# Patient Record
Sex: FEMALE | Race: WHITE | Employment: OTHER | URBAN - METROPOLITAN AREA
[De-identification: names, ages, dates, MRNs, and addresses within clinical notes are randomized per-mention and may not be internally consistent; named-entity substitution may affect disease eponyms.]

---

## 2017-01-31 PROBLEM — I26.99 OTHER ACUTE PULMONARY EMBOLISM WITHOUT ACUTE COR PULMONALE (HCC): Status: ACTIVE | Noted: 2017-01-31

## 2017-01-31 PROBLEM — F11.20 UNCOMPLICATED OPIOID DEPENDENCE (HCC): Status: ACTIVE | Noted: 2017-01-31

## 2017-02-07 PROBLEM — Z86.711 HISTORY OF PULMONARY EMBOLISM: Status: ACTIVE | Noted: 2017-02-07

## 2017-03-16 PROBLEM — M81.0 OSTEOPOROSIS: Status: ACTIVE | Noted: 2017-03-16

## 2017-05-23 PROBLEM — R29.6 FREQUENT FALLS: Status: ACTIVE | Noted: 2017-05-23

## 2017-06-01 PROBLEM — I10 ESSENTIAL HYPERTENSION: Status: ACTIVE | Noted: 2017-06-01

## 2017-06-01 PROBLEM — A04.72 C. DIFFICILE DIARRHEA: Status: ACTIVE | Noted: 2017-06-01

## 2017-06-15 PROBLEM — I26.99 OTHER ACUTE PULMONARY EMBOLISM WITHOUT ACUTE COR PULMONALE (HCC): Status: RESOLVED | Noted: 2017-01-31 | Resolved: 2017-06-15

## 2017-06-15 PROBLEM — I50.32 CHRONIC DIASTOLIC CONGESTIVE HEART FAILURE (HCC): Status: ACTIVE | Noted: 2017-06-15

## 2017-11-15 PROBLEM — Z86.19 HISTORY OF CLOSTRIDIUM DIFFICILE COLITIS: Status: ACTIVE | Noted: 2017-11-15

## 2018-04-05 PROBLEM — M47.816 LUMBAR FACET ARTHROPATHY: Status: ACTIVE | Noted: 2018-04-05

## 2018-08-06 NOTE — TELEPHONE ENCOUNTER
Informed pt the Pain Clinic is unable to assist patient at this time. Verbalized understanding, no further questions at this time.

## 2018-08-06 NOTE — TELEPHONE ENCOUNTER
melvina to inform the pt she is not a candidate for the Pain Clinic-we cannot help the pt with her medical needs

## 2018-08-24 PROBLEM — I69.359 HEMIPARESIS DUE TO RECENT CEREBROVASCULAR ACCIDENT (CVA) (HCC): Status: ACTIVE | Noted: 2018-08-24

## 2018-12-10 PROBLEM — J45.30 MILD PERSISTENT ASTHMA WITHOUT COMPLICATION: Status: ACTIVE | Noted: 2018-12-10

## 2019-11-08 PROBLEM — D84.9 IMMUNOSUPPRESSED STATUS (HCC): Status: ACTIVE | Noted: 2019-11-08

## 2019-11-12 PROBLEM — I69.359 HEMIPARESIS DUE TO RECENT CEREBROVASCULAR ACCIDENT (CVA) (HCC): Status: RESOLVED | Noted: 2018-08-24 | Resolved: 2019-11-12

## 2020-12-02 PROBLEM — N18.30 STAGE 3 CHRONIC KIDNEY DISEASE, UNSPECIFIED WHETHER STAGE 3A OR 3B CKD (HCC): Status: ACTIVE | Noted: 2020-12-02

## 2021-08-18 NOTE — PROGRESS NOTES
[facility] New Problem Note    CC: Right shoulder pain    HPI: This 78year old female presents today with complaints of right shoulder pain.   Onset of symptoms occurred 2 days ago which time she fell on the right shoulder while getting up onto a sandy Surgeon: Santi Cano MD;  Location: [facility] FOR PAIN MANAGEMENT   • HIP REPLACEMENT SURGERY Right     x 3   • HYSTERECTOMY     • INJECTION, W/WO CONTRAST, DX/THERAPEUTIC SUBSTANCE, EPIDURAL/SUBARACHNOID; LUMBAR/SACRAL N/A 12/8/2015    Procedure: C tablets (20 mg total) by mouth every 7 days. 96 tablet 0   • DULoxetine HCl (CYMBALTA) 30 MG Oral Cap DR Particles Take 1 capsule (30 mg total) by mouth daily.  Take a total of 90 mg/d: 1 tablet of 30 mg and 1 tablet of 60 mg 90 capsule 1   • gabapentin 100 mouth daily. • Ondansetron HCl (ZOFRAN) 4 mg tablet Take 1 tablet (4 mg total) by mouth every 8 (eight) hours as needed for Nausea.  12 tablet 0   • methylPREDNISolone 4 MG Oral Tablet Therapy Pack Use as directed for 6 days 21 tablet 0   • modafinil 10 range of motion at the elbow, wrist, and digits. She does have pain with abduction of the shoulder. Sensation is present to light touch.           Imaging: X-rays of the right shoulder were personally viewed, independently interpreted and radiology report

## 2021-08-30 NOTE — TELEPHONE ENCOUNTER
Verified voicemail reached with verbal consent signed. Attempted to call Colletta Lamprey regarding shoulder pain questions. Reached voicemail, left voicemail and provided a detail message with my call back contact information.

## 2021-08-30 NOTE — TELEPHONE ENCOUNTER
Verified voicemail reached with verbal consent signed. Attempted to call Colletta Lamprey regarding notification to come to her appt Wednesday, no new xrays needed at this time.  Reached voicemail, left voicemail and provided a detail message with my call back

## 2021-08-30 NOTE — TELEPHONE ENCOUNTER
Spoke with patient who stated she has been maintaining her treatment course for her shouler since [facility] [place] [place] [facility].  The swelling has decreased, and pt reports since Saturday she has notified a significant increase in pain not relieve

## 2021-09-01 NOTE — PROGRESS NOTES
[facility] Progress Note      Chief Complaint:  Right shoulder pain      Subjective: Napoleon Fofana is a 78year old female who is here for reevaluation of her right shoulder.   Approximately 2 weeks ago she fell on the right shoulder while getting up requesting a stronger pain medication.   I explained that unfortunately since she is on Suboxone I am not comfortable prescribing pain medication and deferred to her pain specialist.  She is asking for the pain medication that Dr. Luz Lomax prescribed her af

## 2021-10-06 NOTE — TELEPHONE ENCOUNTER
Appt set for eval with Dr. Sherita Whitman. Okay per MD. Hip xrays ordered. Pt verbalized understanding. No further questions at this time.

## 2021-10-06 NOTE — TELEPHONE ENCOUNTER
Verified voicemail reached with verbal consent signed. Attempted to call Anayelibaldemar Moni regarding hip pain. Reached voicemail, left voicemail and provided a detail message with my call back contact information.

## 2021-10-09 NOTE — PROGRESS NOTES
Patient is a 44-year-old white female here for evaluation of her right shoulder and her right hip. Patient had a reverse total shoulder arthroplasty performed in the past.  She had a fall about 6 weeks ago injuring her right shoulder.   At that time she fo that of possible pubic rami fractures and possible SI joint strain of the right hemipelvis. In light of the findings on x-ray and clinically I have advised her going ahead with a CT scan of her pelvis with a metallic artifact reduction sequence.   She wi

## 2021-10-11 NOTE — TELEPHONE ENCOUNTER
Marika Barroso Patient's son in law called regarding patient recent admission to [facility] hospital on 10/9/21. Son in law states that patient is having severe abdominal pain, blood in stools  And increase in pain while trying to walk on the hip .  Per son in law, [facility] is

## 2021-10-18 PROBLEM — K80.20 GALLSTONES: Status: ACTIVE | Noted: 2021-10-18

## 2021-12-08 PROBLEM — L97.922 ULCER OF LEFT LOWER EXTREMITY WITH FAT LAYER EXPOSED (HCC): Status: ACTIVE | Noted: 2021-12-08

## 2021-12-16 PROBLEM — D84.9 IMMUNOCOMPROMISED (HCC): Status: ACTIVE | Noted: 2019-11-08

## 2022-01-27 PROBLEM — L97.922 ULCER OF LEFT LOWER EXTREMITY WITH FAT LAYER EXPOSED (HCC): Status: RESOLVED | Noted: 2021-12-08 | Resolved: 2022-01-27

## 2022-04-21 NOTE — PROGRESS NOTES
Patient is a 79-year-old white female here for complaints of right hip instability. Patient has had a right total hip arthroplasty performed several years ago. Patient subsequent to that was having some dislocations that required a captured cup to be placed. The patient more recently has been having a feeling that her hip is going out of place. The patient is exam shows her to have a very stable hip and no pain with passive range of motion of the right hip. Neurologically the patient has some weakness in the right leg rather diffusely. Patient's x-rays of her right hip shows her prosthesis to be in good position. No evidence of loosening. X-rays of her lumbar spine shows severe degenerative changes with degenerative scoliosis. Impression is that of radiculopathy and degenerative disc disease lumbar spine. Second impression is that of a stable right total hip arthroplasty. I explained to the patient there was no evidence of instability and that with a captured cup she will not likely have any feeling of instability except for the possibility of a dislocation with a fall. I advised the patient to see one of the back surgeons to see if there is any options for her. The arthritis is fairly severe and may have limited ability to resolve the issue. I did advise her to follow-up with me if she has any questions after she talks to them.

## 2022-06-02 ENCOUNTER — APPOINTMENT (OUTPATIENT)
Dept: WOUND CARE | Facility: HOSPITAL | Age: 77
End: 2022-06-02
Attending: CLINICAL NURSE SPECIALIST
Payer: MEDICARE

## 2022-06-15 ENCOUNTER — HOSPITAL ENCOUNTER (INPATIENT)
Facility: HOSPITAL | Age: 77
LOS: 4 days | Discharge: NURSING, REHAB OR LONG-TERM CARE | End: 2022-06-19
Attending: ORTHOPAEDIC SURGERY | Admitting: ORTHOPAEDIC SURGERY
Payer: MEDICARE

## 2022-06-15 ENCOUNTER — ANESTHESIA EVENT (OUTPATIENT)
Dept: SURGERY | Facility: HOSPITAL | Age: 77
End: 2022-06-15
Payer: MEDICARE

## 2022-06-15 ENCOUNTER — APPOINTMENT (OUTPATIENT)
Dept: GENERAL RADIOLOGY | Facility: HOSPITAL | Age: 77
End: 2022-06-15
Attending: ORTHOPAEDIC SURGERY
Payer: MEDICARE

## 2022-06-15 ENCOUNTER — ANESTHESIA (OUTPATIENT)
Dept: SURGERY | Facility: HOSPITAL | Age: 77
End: 2022-06-15
Payer: MEDICARE

## 2022-06-15 DIAGNOSIS — I63.9 CEREBROVASCULAR ACCIDENT (CVA), UNSPECIFIED MECHANISM (HCC): ICD-10-CM

## 2022-06-15 PROBLEM — M48.00 SPINAL STENOSIS: Status: ACTIVE | Noted: 2022-06-15

## 2022-06-15 PROCEDURE — 72020 X-RAY EXAM OF SPINE 1 VIEW: CPT | Performed by: ORTHOPAEDIC SURGERY

## 2022-06-15 PROCEDURE — 0SG1071 FUSION OF 2 OR MORE LUMBAR VERTEBRAL JOINTS WITH AUTOLOGOUS TISSUE SUBSTITUTE, POSTERIOR APPROACH, POSTERIOR COLUMN, OPEN APPROACH: ICD-10-PCS | Performed by: ORTHOPAEDIC SURGERY

## 2022-06-15 PROCEDURE — 00BT0ZZ EXCISION OF SPINAL MENINGES, OPEN APPROACH: ICD-10-PCS | Performed by: ORTHOPAEDIC SURGERY

## 2022-06-15 PROCEDURE — 88304 TISSUE EXAM BY PATHOLOGIST: CPT | Performed by: ORTHOPAEDIC SURGERY

## 2022-06-15 DEVICE — DURASEAL® EXACT SPINAL SEALANT SYSTEM 5ML 5 PACK
Type: IMPLANTABLE DEVICE | Site: BACK | Status: FUNCTIONAL
Brand: DURASEAL EXACT SPINAL SEALANT SYSTEM

## 2022-06-15 DEVICE — COLLAGEN DURAL REGENERATION MEMBRANE 1IN X 3IN (2.5CM X 7.5CM)
Type: IMPLANTABLE DEVICE | Site: BACK | Status: FUNCTIONAL
Brand: DURAMATRIX-ONLAY PLUS

## 2022-06-15 RX ORDER — ONDANSETRON 2 MG/ML
INJECTION INTRAMUSCULAR; INTRAVENOUS AS NEEDED
Status: DISCONTINUED | OUTPATIENT
Start: 2022-06-15 | End: 2022-06-15 | Stop reason: SURG

## 2022-06-15 RX ORDER — MORPHINE SULFATE 4 MG/ML
4 INJECTION, SOLUTION INTRAMUSCULAR; INTRAVENOUS EVERY 2 HOUR PRN
Status: DISCONTINUED | OUTPATIENT
Start: 2022-06-15 | End: 2022-06-18

## 2022-06-15 RX ORDER — FUROSEMIDE 20 MG/1
20 TABLET ORAL DAILY
COMMUNITY

## 2022-06-15 RX ORDER — ACETAMINOPHEN 325 MG/1
650 TABLET ORAL EVERY 4 HOURS PRN
Status: DISCONTINUED | OUTPATIENT
Start: 2022-06-15 | End: 2022-06-16

## 2022-06-15 RX ORDER — PHENYLEPHRINE HCL 10 MG/ML
VIAL (ML) INJECTION AS NEEDED
Status: DISCONTINUED | OUTPATIENT
Start: 2022-06-15 | End: 2022-06-15 | Stop reason: SURG

## 2022-06-15 RX ORDER — ROCURONIUM BROMIDE 10 MG/ML
INJECTION, SOLUTION INTRAVENOUS AS NEEDED
Status: DISCONTINUED | OUTPATIENT
Start: 2022-06-15 | End: 2022-06-15 | Stop reason: SURG

## 2022-06-15 RX ORDER — BISACODYL 10 MG
10 SUPPOSITORY, RECTAL RECTAL
Status: DISCONTINUED | OUTPATIENT
Start: 2022-06-15 | End: 2022-06-19

## 2022-06-15 RX ORDER — HYDROMORPHONE HYDROCHLORIDE 1 MG/ML
0.6 INJECTION, SOLUTION INTRAMUSCULAR; INTRAVENOUS; SUBCUTANEOUS EVERY 5 MIN PRN
Status: DISCONTINUED | OUTPATIENT
Start: 2022-06-15 | End: 2022-06-15 | Stop reason: HOSPADM

## 2022-06-15 RX ORDER — METOPROLOL SUCCINATE 50 MG/1
50 TABLET, EXTENDED RELEASE ORAL
Status: DISCONTINUED | OUTPATIENT
Start: 2022-06-16 | End: 2022-06-19

## 2022-06-15 RX ORDER — MORPHINE SULFATE 2 MG/ML
1 INJECTION, SOLUTION INTRAMUSCULAR; INTRAVENOUS EVERY 2 HOUR PRN
Status: DISCONTINUED | OUTPATIENT
Start: 2022-06-15 | End: 2022-06-18

## 2022-06-15 RX ORDER — MIDAZOLAM HYDROCHLORIDE 1 MG/ML
INJECTION INTRAMUSCULAR; INTRAVENOUS AS NEEDED
Status: DISCONTINUED | OUTPATIENT
Start: 2022-06-15 | End: 2022-06-15 | Stop reason: SURG

## 2022-06-15 RX ORDER — HYDROMORPHONE HYDROCHLORIDE 1 MG/ML
0.2 INJECTION, SOLUTION INTRAMUSCULAR; INTRAVENOUS; SUBCUTANEOUS EVERY 5 MIN PRN
Status: DISCONTINUED | OUTPATIENT
Start: 2022-06-15 | End: 2022-06-15 | Stop reason: HOSPADM

## 2022-06-15 RX ORDER — ONDANSETRON 2 MG/ML
4 INJECTION INTRAMUSCULAR; INTRAVENOUS EVERY 4 HOURS PRN
Status: ACTIVE | OUTPATIENT
Start: 2022-06-15 | End: 2022-06-16

## 2022-06-15 RX ORDER — OXYCODONE HYDROCHLORIDE 10 MG/1
10 TABLET ORAL EVERY 4 HOURS PRN
Status: DISCONTINUED | OUTPATIENT
Start: 2022-06-15 | End: 2022-06-16

## 2022-06-15 RX ORDER — ONDANSETRON 2 MG/ML
INJECTION INTRAMUSCULAR; INTRAVENOUS
Status: DISPENSED
Start: 2022-06-15 | End: 2022-06-15

## 2022-06-15 RX ORDER — METOCLOPRAMIDE HYDROCHLORIDE 5 MG/ML
INJECTION INTRAMUSCULAR; INTRAVENOUS AS NEEDED
Status: DISCONTINUED | OUTPATIENT
Start: 2022-06-15 | End: 2022-06-15 | Stop reason: SURG

## 2022-06-15 RX ORDER — SODIUM CHLORIDE 9 MG/ML
INJECTION, SOLUTION INTRAVENOUS CONTINUOUS
Status: DISCONTINUED | OUTPATIENT
Start: 2022-06-15 | End: 2022-06-19

## 2022-06-15 RX ORDER — DIPHENHYDRAMINE HYDROCHLORIDE 50 MG/ML
25 INJECTION INTRAMUSCULAR; INTRAVENOUS EVERY 4 HOURS PRN
Status: DISCONTINUED | OUTPATIENT
Start: 2022-06-15 | End: 2022-06-19

## 2022-06-15 RX ORDER — OXYCODONE HYDROCHLORIDE 5 MG/1
5 TABLET ORAL EVERY 4 HOURS PRN
Status: DISCONTINUED | OUTPATIENT
Start: 2022-06-15 | End: 2022-06-16

## 2022-06-15 RX ORDER — HYDROMORPHONE HYDROCHLORIDE 1 MG/ML
0.4 INJECTION, SOLUTION INTRAMUSCULAR; INTRAVENOUS; SUBCUTANEOUS EVERY 5 MIN PRN
Status: DISCONTINUED | OUTPATIENT
Start: 2022-06-15 | End: 2022-06-15 | Stop reason: HOSPADM

## 2022-06-15 RX ORDER — NALOXONE HYDROCHLORIDE 0.4 MG/ML
80 INJECTION, SOLUTION INTRAMUSCULAR; INTRAVENOUS; SUBCUTANEOUS AS NEEDED
Status: DISCONTINUED | OUTPATIENT
Start: 2022-06-15 | End: 2022-06-15 | Stop reason: HOSPADM

## 2022-06-15 RX ORDER — DULOXETIN HYDROCHLORIDE 30 MG/1
60 CAPSULE, DELAYED RELEASE ORAL EVERY MORNING
Status: DISCONTINUED | OUTPATIENT
Start: 2022-06-16 | End: 2022-06-19

## 2022-06-15 RX ORDER — CELECOXIB 200 MG/1
CAPSULE ORAL
Status: DISCONTINUED
Start: 2022-06-15 | End: 2022-06-15 | Stop reason: WASHOUT

## 2022-06-15 RX ORDER — HYDROCODONE BITARTRATE AND ACETAMINOPHEN 5; 325 MG/1; MG/1
2 TABLET ORAL ONCE AS NEEDED
Status: DISCONTINUED | OUTPATIENT
Start: 2022-06-15 | End: 2022-06-15 | Stop reason: HOSPADM

## 2022-06-15 RX ORDER — ONDANSETRON 2 MG/ML
4 INJECTION INTRAMUSCULAR; INTRAVENOUS ONCE
Status: COMPLETED | OUTPATIENT
Start: 2022-06-15 | End: 2022-06-15

## 2022-06-15 RX ORDER — METOCLOPRAMIDE HYDROCHLORIDE 5 MG/ML
10 INJECTION INTRAMUSCULAR; INTRAVENOUS EVERY 8 HOURS PRN
Status: DISCONTINUED | OUTPATIENT
Start: 2022-06-15 | End: 2022-06-15 | Stop reason: HOSPADM

## 2022-06-15 RX ORDER — HYDROCODONE BITARTRATE AND ACETAMINOPHEN 5; 325 MG/1; MG/1
1 TABLET ORAL ONCE AS NEEDED
Status: DISCONTINUED | OUTPATIENT
Start: 2022-06-15 | End: 2022-06-15 | Stop reason: HOSPADM

## 2022-06-15 RX ORDER — BUPRENORPHINE HYDROCHLORIDE AND NALOXONE HYDROCHLORIDE DIHYDRATE 8; 2 MG/1; MG/1
1 TABLET SUBLINGUAL 2 TIMES DAILY
COMMUNITY
End: 2022-06-19

## 2022-06-15 RX ORDER — PREDNISONE 1 MG/1
5 TABLET ORAL
Status: DISCONTINUED | OUTPATIENT
Start: 2022-06-16 | End: 2022-06-19

## 2022-06-15 RX ORDER — POLYETHYLENE GLYCOL 3350 17 G/17G
17 POWDER, FOR SOLUTION ORAL DAILY PRN
Status: DISCONTINUED | OUTPATIENT
Start: 2022-06-15 | End: 2022-06-19

## 2022-06-15 RX ORDER — METOPROLOL TARTRATE 5 MG/5ML
2.5 INJECTION INTRAVENOUS ONCE
Status: DISCONTINUED | OUTPATIENT
Start: 2022-06-15 | End: 2022-06-15 | Stop reason: HOSPADM

## 2022-06-15 RX ORDER — SENNOSIDES 8.6 MG
17.2 TABLET ORAL NIGHTLY
Status: DISCONTINUED | OUTPATIENT
Start: 2022-06-15 | End: 2022-06-19

## 2022-06-15 RX ORDER — CEFAZOLIN SODIUM/WATER 2 G/20 ML
2 SYRINGE (ML) INTRAVENOUS EVERY 8 HOURS
Status: COMPLETED | OUTPATIENT
Start: 2022-06-15 | End: 2022-06-16

## 2022-06-15 RX ORDER — DOCUSATE SODIUM 100 MG/1
100 CAPSULE, LIQUID FILLED ORAL 2 TIMES DAILY
Status: DISCONTINUED | OUTPATIENT
Start: 2022-06-15 | End: 2022-06-19

## 2022-06-15 RX ORDER — ACETAMINOPHEN 500 MG
1000 TABLET ORAL ONCE AS NEEDED
Status: DISCONTINUED | OUTPATIENT
Start: 2022-06-15 | End: 2022-06-15 | Stop reason: HOSPADM

## 2022-06-15 RX ORDER — HYDROMORPHONE HYDROCHLORIDE 1 MG/ML
INJECTION, SOLUTION INTRAMUSCULAR; INTRAVENOUS; SUBCUTANEOUS
Status: COMPLETED
Start: 2022-06-15 | End: 2022-06-15

## 2022-06-15 RX ORDER — LIDOCAINE HYDROCHLORIDE 10 MG/ML
INJECTION, SOLUTION EPIDURAL; INFILTRATION; INTRACAUDAL; PERINEURAL AS NEEDED
Status: DISCONTINUED | OUTPATIENT
Start: 2022-06-15 | End: 2022-06-15 | Stop reason: SURG

## 2022-06-15 RX ORDER — CEFAZOLIN SODIUM/WATER 2 G/20 ML
2 SYRINGE (ML) INTRAVENOUS ONCE
Status: COMPLETED | OUTPATIENT
Start: 2022-06-15 | End: 2022-06-15

## 2022-06-15 RX ORDER — SODIUM CHLORIDE, SODIUM LACTATE, POTASSIUM CHLORIDE, CALCIUM CHLORIDE 600; 310; 30; 20 MG/100ML; MG/100ML; MG/100ML; MG/100ML
INJECTION, SOLUTION INTRAVENOUS CONTINUOUS
Status: DISCONTINUED | OUTPATIENT
Start: 2022-06-15 | End: 2022-06-15 | Stop reason: HOSPADM

## 2022-06-15 RX ORDER — SODIUM PHOSPHATE, DIBASIC AND SODIUM PHOSPHATE, MONOBASIC 7; 19 G/133ML; G/133ML
1 ENEMA RECTAL ONCE AS NEEDED
Status: DISCONTINUED | OUTPATIENT
Start: 2022-06-15 | End: 2022-06-19

## 2022-06-15 RX ORDER — SODIUM CHLORIDE, SODIUM LACTATE, POTASSIUM CHLORIDE, CALCIUM CHLORIDE 600; 310; 30; 20 MG/100ML; MG/100ML; MG/100ML; MG/100ML
INJECTION, SOLUTION INTRAVENOUS CONTINUOUS
Status: DISCONTINUED | OUTPATIENT
Start: 2022-06-15 | End: 2022-06-19

## 2022-06-15 RX ORDER — GABAPENTIN 100 MG/1
200 CAPSULE ORAL 3 TIMES DAILY
Status: DISCONTINUED | OUTPATIENT
Start: 2022-06-15 | End: 2022-06-17

## 2022-06-15 RX ORDER — ONDANSETRON 2 MG/ML
4 INJECTION INTRAMUSCULAR; INTRAVENOUS EVERY 6 HOURS PRN
Status: DISCONTINUED | OUTPATIENT
Start: 2022-06-15 | End: 2022-06-15 | Stop reason: HOSPADM

## 2022-06-15 RX ORDER — MORPHINE SULFATE 2 MG/ML
2 INJECTION, SOLUTION INTRAMUSCULAR; INTRAVENOUS EVERY 2 HOUR PRN
Status: DISCONTINUED | OUTPATIENT
Start: 2022-06-15 | End: 2022-06-19

## 2022-06-15 RX ORDER — MEPERIDINE HYDROCHLORIDE 25 MG/ML
12.5 INJECTION INTRAMUSCULAR; INTRAVENOUS; SUBCUTANEOUS AS NEEDED
Status: DISCONTINUED | OUTPATIENT
Start: 2022-06-15 | End: 2022-06-15 | Stop reason: HOSPADM

## 2022-06-15 RX ORDER — DEXAMETHASONE SODIUM PHOSPHATE 4 MG/ML
VIAL (ML) INJECTION AS NEEDED
Status: DISCONTINUED | OUTPATIENT
Start: 2022-06-15 | End: 2022-06-15 | Stop reason: SURG

## 2022-06-15 RX ORDER — ACETAMINOPHEN 500 MG
1000 TABLET ORAL ONCE
Status: DISCONTINUED | OUTPATIENT
Start: 2022-06-15 | End: 2022-06-15 | Stop reason: HOSPADM

## 2022-06-15 RX ORDER — DIPHENHYDRAMINE HCL 25 MG
25 CAPSULE ORAL EVERY 4 HOURS PRN
Status: DISCONTINUED | OUTPATIENT
Start: 2022-06-15 | End: 2022-06-19

## 2022-06-15 RX ORDER — LEVOTHYROXINE SODIUM 112 UG/1
112 TABLET ORAL
Status: DISCONTINUED | OUTPATIENT
Start: 2022-06-16 | End: 2022-06-19

## 2022-06-15 RX ORDER — METHOCARBAMOL 500 MG/1
1 TABLET, FILM COATED ORAL 3 TIMES DAILY
Status: ON HOLD | COMMUNITY
Start: 2022-06-14 | End: 2022-06-19

## 2022-06-15 RX ORDER — VANCOMYCIN HYDROCHLORIDE 1 G/20ML
INJECTION, POWDER, LYOPHILIZED, FOR SOLUTION INTRAVENOUS AS NEEDED
Status: DISCONTINUED | OUTPATIENT
Start: 2022-06-15 | End: 2022-06-15 | Stop reason: HOSPADM

## 2022-06-15 RX ORDER — CEFAZOLIN SODIUM/WATER 2 G/20 ML
SYRINGE (ML) INTRAVENOUS
Status: DISPENSED
Start: 2022-06-15 | End: 2022-06-15

## 2022-06-15 RX ORDER — MELATONIN
800 DAILY
Status: DISCONTINUED | OUTPATIENT
Start: 2022-06-16 | End: 2022-06-19

## 2022-06-15 RX ADMIN — SODIUM CHLORIDE, SODIUM LACTATE, POTASSIUM CHLORIDE, CALCIUM CHLORIDE: 600; 310; 30; 20 INJECTION, SOLUTION INTRAVENOUS at 14:00:00

## 2022-06-15 RX ADMIN — PHENYLEPHRINE HCL 100 MCG: 10 MG/ML VIAL (ML) INJECTION at 13:51:00

## 2022-06-15 RX ADMIN — METOCLOPRAMIDE HYDROCHLORIDE 10 MG: 5 INJECTION INTRAMUSCULAR; INTRAVENOUS at 12:49:00

## 2022-06-15 RX ADMIN — MIDAZOLAM HYDROCHLORIDE 2 MG: 1 INJECTION INTRAMUSCULAR; INTRAVENOUS at 12:49:00

## 2022-06-15 RX ADMIN — ONDANSETRON 4 MG: 2 INJECTION INTRAMUSCULAR; INTRAVENOUS at 12:49:00

## 2022-06-15 RX ADMIN — SODIUM CHLORIDE, SODIUM LACTATE, POTASSIUM CHLORIDE, CALCIUM CHLORIDE: 600; 310; 30; 20 INJECTION, SOLUTION INTRAVENOUS at 14:52:00

## 2022-06-15 RX ADMIN — PHENYLEPHRINE HCL 100 MCG: 10 MG/ML VIAL (ML) INJECTION at 14:22:00

## 2022-06-15 RX ADMIN — PHENYLEPHRINE HCL 100 MCG: 10 MG/ML VIAL (ML) INJECTION at 14:08:00

## 2022-06-15 RX ADMIN — CEFAZOLIN SODIUM/WATER 2 G: 2 G/20 ML SYRINGE (ML) INTRAVENOUS at 13:00:00

## 2022-06-15 RX ADMIN — LIDOCAINE HYDROCHLORIDE 50 MG: 10 INJECTION, SOLUTION EPIDURAL; INFILTRATION; INTRACAUDAL; PERINEURAL at 12:49:00

## 2022-06-15 RX ADMIN — SODIUM CHLORIDE, SODIUM LACTATE, POTASSIUM CHLORIDE, CALCIUM CHLORIDE: 600; 310; 30; 20 INJECTION, SOLUTION INTRAVENOUS at 13:28:00

## 2022-06-15 RX ADMIN — DEXAMETHASONE SODIUM PHOSPHATE 8 MG: 4 MG/ML VIAL (ML) INJECTION at 12:49:00

## 2022-06-15 RX ADMIN — ROCURONIUM BROMIDE 50 MG: 10 INJECTION, SOLUTION INTRAVENOUS at 12:49:00

## 2022-06-15 NOTE — ANESTHESIA POSTPROCEDURE EVALUATION
[address] [place] [address] Patient Status:  Outpatient in a Bed   Age/Gender 78year old female MRN [id number]   Location [address] [facility] Attending Kitty Shelby MD   Hosp Day # 0 PCP Zainab Thompson MD       Anesthesia Post-op Note    LUMBAR 2 LUMBAR 3, LUMBAR 3 - LUMBAR 4, LUMBAR 4 - LUMBAR 5 DECOMPRESSION AND UNINSTRUMENTED FUSION    Procedure Summary     Date: 06/15/22 Room / Location: [facility] MAIN OR 11 / [facility] MAIN OR    Anesthesia Start: 6205 Anesthesia Stop:     Procedure: LUMBAR 2 LUMBAR 3, LUMBAR 3 - LUMBAR 4, LUMBAR 4 - LUMBAR 5 DECOMPRESSION AND UNINSTRUMENTED FUSION (N/A Spine Lumbar) Diagnosis: (LUMBAR 3 - LUMBAR 4 STENOSIS, LUMBAR 2 - LUMBAR 5 STENOSIS)    Surgeons: Kitty Shelby MD Anesthesiologist: Shavonne Berg DO    Anesthesia Type: general ASA Status: 3          Anesthesia Type: general    Vitals Value Taken Time   /83 06/15/22 1532   Temp 99 06/15/22 1532   Pulse 95 06/15/22 1532   Resp 15 06/15/22 1532   SpO2 96 06/15/22 1532       Patient Location: PACU    Anesthesia Type: general    Airway Patency: patent    Postop Pain Control: adequate    Mental Status: preanesthetic baseline    Nausea/Vomiting: none    Cardiopulmonary/Hydration status: stable euvolemic    Complications: no apparent anesthesia related complications    Postop vital signs: stable    Dental Exam: Unchanged from Preop    Patient to be discharged from PACU when criteria met.

## 2022-06-15 NOTE — ANESTHESIA PROCEDURE NOTES
Airway  Date/Time: 6/15/2022 12:56 PM  Urgency: Elective    Airway not difficult    General Information and Staff    Patient location during procedure: OR  Anesthesiologist: Alem Archer DO  Resident/CRNA: Gaby Bhatia CRNA  Performed: CRNA     Indications and Patient Condition  Indications for airway management: anesthesia  Sedation level: deep  Preoxygenated: yes  Patient position: sniffing  Mask difficulty assessment: 1 - vent by mask    Final Airway Details  Final airway type: endotracheal airway      Successful airway: ETT  Cuffed: yes   Successful intubation technique: direct laryngoscopy  Facilitating devices/methods: intubating stylet  Endotracheal tube insertion site: oral  Blade: Akila  Blade size: #4  ETT size (mm): 7.0    Cormack-Lehane Classification: grade IIA - partial view of glottis  Placement verified by: chest auscultation and capnometry   Cuff volume (mL): 7  Measured from: teeth  ETT to teeth (cm): 21  Number of attempts at approach: 1    Additional Comments  Teeth lips and tongue pre induction condition.

## 2022-06-15 NOTE — PROGRESS NOTES
Pt on suboxone with apparent attempting to wean. Had normal intra-op response to narcotics intra-op & post op (manageable pain after 1mg dialaudid & 50 mcg fentanyl). There was no plan that I could find in [facility] on how to continue to manage patient. Primary will attempt to discuss with prescribing doc. We will be available to help also.

## 2022-06-15 NOTE — BRIEF OP NOTE
Pre-Operative Diagnosis: LUMBAR 3 - LUMBAR 4 STENOSIS, LUMBAR 2 - LUMBAR 5 STENOSIS     Post-Operative Diagnosis: LUMBAR 3 - LUMBAR 4 STENOSIS, LUMBAR 2 - LUMBAR 5 STENOSIS      Procedure Performed:   LUMBAR 2 LUMBAR 3, LUMBAR 3 - LUMBAR 4, LUMBAR 4 - LUMBAR 5 DECOMPRESSION AND UNINSTRUMENTED FUSION    Surgeon(s) and Role:     * Neeraj Raines MD - Primary    Assistant(s):  PA: Ludmila Mckoy     Surgical Findings: above     Specimen: L3-4 epidural mass     Estimated Blood Loss: Blood Output: 100 mL (6/15/2022  2:51 PM)      Dictation Number:      HAYDEN Valentine  6/15/2022  3:23 PM

## 2022-06-16 LAB
HCT VFR BLD AUTO: 29.7 %
HGB BLD-MCNC: 9.4 G/DL

## 2022-06-16 PROCEDURE — 97530 THERAPEUTIC ACTIVITIES: CPT

## 2022-06-16 PROCEDURE — 97161 PT EVAL LOW COMPLEX 20 MIN: CPT

## 2022-06-16 PROCEDURE — 85014 HEMATOCRIT: CPT | Performed by: PHYSICIAN ASSISTANT

## 2022-06-16 PROCEDURE — 97165 OT EVAL LOW COMPLEX 30 MIN: CPT

## 2022-06-16 PROCEDURE — 85018 HEMOGLOBIN: CPT | Performed by: PHYSICIAN ASSISTANT

## 2022-06-16 RX ORDER — OXYCODONE HYDROCHLORIDE 5 MG/1
5 TABLET ORAL EVERY 4 HOURS PRN
Status: DISCONTINUED | OUTPATIENT
Start: 2022-06-16 | End: 2022-06-17

## 2022-06-16 RX ORDER — FUROSEMIDE 20 MG/1
20 TABLET ORAL DAILY
Status: DISCONTINUED | OUTPATIENT
Start: 2022-06-16 | End: 2022-06-19

## 2022-06-16 RX ORDER — ACETAMINOPHEN 325 MG/1
650 TABLET ORAL EVERY 4 HOURS PRN
Status: DISCONTINUED | OUTPATIENT
Start: 2022-06-16 | End: 2022-06-17

## 2022-06-16 NOTE — PLAN OF CARE
A&Ox4. VSS. On 2L O2 via nasal cannula. . SCDs and Teds on BLE. Tolerating diet. Last BM 6/15. Nelson catheter in place postop. Pain managed with PO medication. Dressing to back C/D/I. Hemovac drain to suction. Plan is for PT/OT eval today. PMR consult. Patient updated on plan of care. Safety precautions in place. Call light within reach. Will continue to monitor.

## 2022-06-16 NOTE — CM/SW NOTE
PMR recommending acute rehab. Referral sent to  via [facility] per pt preference. Await confirmation of acceptance and medical clearance for discharge. / to remain available for support and/or discharge planning.      Luke Blanco [facility]  Discharge Planner  [phone]

## 2022-06-16 NOTE — PLAN OF CARE
Pt Aox4 drowsy, VSS on 2L, , SCDs, IS and ankle pumps encouraged. Dressing to lower back CDI with hemovac drain to suction. Pain managed with PRN medication. Denies sob, chest pain, n/v. IVF as ordered. Regular diet as tolerated. Last BM 06/15, Nelson in place. Reminded to \"call, don't fall\", call light placed within reach, safety precaution in place. POC discussed with patient. Plan: PT/OT to see, monitor drain  Will continue to monitor.

## 2022-06-17 RX ORDER — HYDROMORPHONE HYDROCHLORIDE 1 MG/ML
1 INJECTION, SOLUTION INTRAMUSCULAR; INTRAVENOUS; SUBCUTANEOUS EVERY 4 HOURS PRN
Status: DISCONTINUED | OUTPATIENT
Start: 2022-06-17 | End: 2022-06-18

## 2022-06-17 RX ORDER — OXYCODONE HYDROCHLORIDE 10 MG/1
20 TABLET ORAL EVERY 4 HOURS PRN
Status: DISCONTINUED | OUTPATIENT
Start: 2022-06-17 | End: 2022-06-18

## 2022-06-17 RX ORDER — GABAPENTIN 300 MG/1
300 CAPSULE ORAL 3 TIMES DAILY
Status: DISCONTINUED | OUTPATIENT
Start: 2022-06-17 | End: 2022-06-19

## 2022-06-17 RX ORDER — DEXAMETHASONE SODIUM PHOSPHATE 4 MG/ML
8 VIAL (ML) INJECTION ONCE
Status: COMPLETED | OUTPATIENT
Start: 2022-06-17 | End: 2022-06-17

## 2022-06-17 RX ORDER — ACETAMINOPHEN 325 MG/1
650 TABLET ORAL EVERY 4 HOURS PRN
Status: DISCONTINUED | OUTPATIENT
Start: 2022-06-17 | End: 2022-06-18

## 2022-06-17 RX ORDER — OXYCODONE HYDROCHLORIDE 5 MG/1
5 TABLET ORAL EVERY 4 HOURS PRN
Status: DISCONTINUED | OUTPATIENT
Start: 2022-06-17 | End: 2022-06-18

## 2022-06-17 RX ORDER — HYDROMORPHONE HYDROCHLORIDE 2 MG/1
1 TABLET ORAL EVERY 4 HOURS PRN
Status: DISCONTINUED | OUTPATIENT
Start: 2022-06-17 | End: 2022-06-17

## 2022-06-17 NOTE — CM/SW NOTE
Patient accepted at [facility] for acute rehab. Updated pt who is agreeable with DC plan. Await medical clearance for discharge. / to remain available for support and/or discharge planning.      Lillian Muñoz [facility]  Discharge Planner  [phone]

## 2022-06-17 NOTE — PLAN OF CARE
Pt AOx4, VSS on RA, , SCDs, TEDs, IS and ankle pumps encouraged. Dressing to lower back CDI with hemovac drain. C/o severe pain to back relieved with PRN PO/IV medication. Denies sob, chest pain, n/v. Regular diet as tolerated. Last BM 05/15, Nelson in place. WBAT. Reminded to \"call, don't fall\", call light placed within reach, safety precaution in place. POC discussed with patient. Plan: DC to Acute rehab ([facility] awaiting placement)  Will continue to monitor.

## 2022-06-17 NOTE — PLAN OF CARE
A&Ox4. VSS. On room air. . SCDs and Teds on BLE. Tolerating diet. Last BM 6/15. Nelson catheter in place postop - to be removed today. Patient reporting severe pain this morning, HAYDEN Butts aware - pain medications adjusted. Patient now asleep. Dressing to back C/D/I. Hemovac drain to suction - to be removed today. PT recommending acute rehab. Patient updated on plan of care. Safety precautions in place. Call light within reach. Will continue to monitor.

## 2022-06-18 LAB
ANION GAP SERPL CALC-SCNC: 5 MMOL/L (ref 0–18)
BUN BLD-MCNC: 15 MG/DL (ref 7–18)
CALCIUM BLD-MCNC: 8.4 MG/DL (ref 8.5–10.1)
CHLORIDE SERPL-SCNC: 96 MMOL/L (ref 98–112)
CO2 SERPL-SCNC: 28 MMOL/L (ref 21–32)
CREAT BLD-MCNC: 0.81 MG/DL
ERYTHROCYTE [DISTWIDTH] IN BLOOD BY AUTOMATED COUNT: 13.3 %
GLUCOSE BLD-MCNC: 135 MG/DL (ref 70–99)
HCT VFR BLD AUTO: 31.4 %
HGB BLD-MCNC: 10.3 G/DL
MCH RBC QN AUTO: 32.3 PG (ref 26–34)
MCHC RBC AUTO-ENTMCNC: 32.8 G/DL (ref 31–37)
MCV RBC AUTO: 98.4 FL
OSMOLALITY SERPL CALC.SUM OF ELEC: 271 MOSM/KG (ref 275–295)
PLATELET # BLD AUTO: 203 10(3)UL (ref 150–450)
POTASSIUM SERPL-SCNC: 3.7 MMOL/L (ref 3.5–5.1)
PROCALCITONIN SERPL-MCNC: <0.05 NG/ML (ref ?–0.16)
RBC # BLD AUTO: 3.19 X10(6)UL
SARS-COV-2 RNA RESP QL NAA+PROBE: NOT DETECTED
SODIUM SERPL-SCNC: 129 MMOL/L (ref 136–145)
WBC # BLD AUTO: 12.4 X10(3) UL (ref 4–11)

## 2022-06-18 PROCEDURE — 85027 COMPLETE CBC AUTOMATED: CPT | Performed by: INTERNAL MEDICINE

## 2022-06-18 PROCEDURE — 80048 BASIC METABOLIC PNL TOTAL CA: CPT | Performed by: INTERNAL MEDICINE

## 2022-06-18 PROCEDURE — 84145 PROCALCITONIN (PCT): CPT | Performed by: INTERNAL MEDICINE

## 2022-06-18 RX ORDER — OXYCODONE HYDROCHLORIDE 10 MG/1
10 TABLET ORAL EVERY 4 HOURS PRN
Status: DISCONTINUED | OUTPATIENT
Start: 2022-06-18 | End: 2022-06-19

## 2022-06-18 RX ORDER — ACETAMINOPHEN 325 MG/1
650 TABLET ORAL EVERY 4 HOURS PRN
Status: DISCONTINUED | OUTPATIENT
Start: 2022-06-18 | End: 2022-06-19

## 2022-06-18 RX ORDER — POTASSIUM CHLORIDE 20 MEQ/1
40 TABLET, EXTENDED RELEASE ORAL ONCE
Status: COMPLETED | OUTPATIENT
Start: 2022-06-18 | End: 2022-06-18

## 2022-06-18 RX ORDER — OXYCODONE HYDROCHLORIDE 10 MG/1
20 TABLET ORAL EVERY 4 HOURS PRN
Status: DISCONTINUED | OUTPATIENT
Start: 2022-06-18 | End: 2022-06-19

## 2022-06-18 NOTE — OPERATIVE REPORT
[facility]    PATIENT'S NAME: Daxa Montemayor   ATTENDING PHYSICIAN: Angeles Maradiaga M.D. OPERATING PHYSICIAN: Angeles Maradiaga M.D. PATIENT ACCOUNT#:   [de-identified]    LOCATION:  [address] [place] [address]  MEDICAL RECORD #:   [id number]       YOB: 1945  ADMISSION DATE:       06/15/2022      OPERATION DATE:  06/15/2022    OPERATIVE REPORT      PREOPERATIVE DIAGNOSIS:  L2-3, L3-4, L4-5 severe stenosis, scoliosis, neural foraminal stenosis, and epidural mass. POSTOPERATIVE DIAGNOSIS:  L2-3, L3-4, L4-5 severe stenosis, scoliosis, neural foraminal stenosis, and epidural mass. PROCEDURE PERFORMED:  L2-3, L3-4, L4-5 decompression, uninstrumented fusion with removal of epidural mass. INDICATIONS:  The patient had failed reasonable conservative measures which are outlined in the patient's clinic medical record. Physical therapy, medical management, injections, alternative treatment are among those offered unless motor deficits are progressive. Indications for surgery are based on scientific literature and MARITZA guidelines. A thorough meeting with the patient and at least one key caregiver was had. The risks and benefits were discussed in significant detail. The risks include, but are not limited to, bleeding, infection, spinal leaks, nerve injuries, recurrent disc herniation, lumbar instability, and need for further surgery. I have gone over the operation using models, diagrams, and the patient's own imaging studies. Additional written and digital sources were provided. No guarantees were made. ASSISTANT:  Lindsay Roberts PA-C. A skilled and experienced assistant was required for the entire surgical procedure. As a lumbar spinal reconstruction, the procedure is done in immediate proximity to critical neural elements and is done in close proximity to the critical vascular and visceral structures. Much of the surgery is done in and around the cauda equine and its exiting nerves.   Any assistance, including, but not limited to, retraction, suction, and other means of facilitation of the procedure requires an individual with substantial postgraduate training and substantial spinal surgical experience. ESTIMATED BLOOD LOSS:  100 mL. DESCRIPTION OF PROCEDURE:  After obtaining informed consent, the patient was taken to the operating room. SCDs and NANCY hose were applied. Preoperative antibiotics were delivered. The patient was placed in the prone position on a Willie frame. All bony prominences were padded. Incision was marked and locally prepped. A spinal needle was applied. X-ray confirmed appropriate localization of our incision. The needle was removed, and the back was sterilely prepped and draped. A longitudinal incision was made with a #10 blade. Bovie electrocautery was used for hemostasis. Dissection was taken down to the level of the fascia. Subperiosteal dissection was taken at the level of the facet joints but keeping the facet capsules entirely intact. Self-retaining retractors were applied. We first began at the proximal level of our decompression and removed portions of the interspinous ligament and spinous processes. This was done at all indicated levels. Curettes were used to gain access to the canal.  Hummel ball was used to dissect ligamentum flavum away and free from the neural elements. Then 4 and 5-mm Kerrisons were used to resect thickened ligamentum flavum and central canal stenosis of the lamina. Attention then turned to the L2-3 level where the cranial and caudal nerve roots were both explored. This was done by elevating a plane with the [facility] ball and curettes between the ligamentum flavum and the dura. With the dura dissected free and downward, we identified the plane and then removed this with 3, 4, and 5 mm Kerrisons. Attention then turned to the foraminotomies, the cranial and caudal nerve roots at this level.   Contralateral decompression was undertaken thus creating a bilateral decompression and bilateral microforaminotomy and hemilaminotomy using undercutting technique. An undercutting facet sparing hemilaminectomy was also completed. With undercutting technique and a Hummel ball in place, we excised ligamentum flavum through the bone at the cranial foramina as needed, facilitating a serial cranial decompression and undercutting microforaminotomy and hemilaminotomy versus undercutting hemilaminectomy. This was done in an undercutting fashion as well and done to examine the central and contralateral neural elements facilitating central bilateral decompression as well. This was done using undercutting technique up to the level of the pedicle, with an extraforaminal area also being decompressed with the undercutting technique. At the conclusion of the decompression, all areas were free of neurologic compression. Attention then turned to the L3-4 level where the cranial and caudal nerve roots were both explored. This was done by elevating a plane with the Rickh Matters ball and curettes between the ligamentum flavum and the dura. With the dura dissected free and downward, we identified the plane and then removed this with 3, 4, and 5 mm Kerrisons. Attention then turned to the foraminotomies, the cranial and caudal nerve roots at this level. Contralateral decompression was undertaken thus creating a bilateral decompression and bilateral microforaminotomy and hemilaminotomy using undercutting technique. An undercutting facet sparing hemilaminectomy was also completed. With undercutting technique and a Hummel ball in place, we excised ligamentum flavum through the bone at the cranial foramina as needed, facilitating a serial cranial decompression and undercutting microforaminotomy and hemilaminotomy versus undercutting hemilaminectomy.   This was done in an undercutting fashion as well and done to examine the central and contralateral neural elements facilitating central bilateral decompression as well. This was done using undercutting technique up to the level of the pedicle, with an extraforaminal area also being decompressed with the undercutting technique. During the process of decompression, additional cystic pathology in excess of degenerative anatomy was identified. The pathology was carefully evaluated, and the decision was made to remove it using microdecompressive techniques. Additional specialized instruments were requested and utilized to establish extradural planes. Microcurrettes and pituitaries were employed to remove the pathology. The tissue was examined and sent to Pathology. Please see hospital reports for results. At the conclusion of the decompression, all areas were free of neurologic compression. Attention then turned to the L4-5 level where the cranial and caudal nerve roots were both explored. This was done by elevating a plane with the [facility] ball and curettes between the ligamentum flavum and the dura. With the dura dissected free and downward, we identified the plane and then removed this with 3, 4, and 5 mm Kerrisons. Attention then turned to the foraminotomies, the cranial and caudal nerve roots at this level. Contralateral decompression was undertaken thus creating a bilateral decompression and bilateral microforaminotomy and hemilaminotomy using undercutting technique. An undercutting facet sparing hemilaminectomy was also completed. With undercutting technique and a Hummel ball in place, we excised ligamentum flavum through the bone at the cranial foramina as needed, facilitating a serial cranial decompression and undercutting microforaminotomy and hemilaminotomy versus undercutting hemilaminectomy. This was done in an undercutting fashion as well and done to examine the central and contralateral neural elements facilitating central bilateral decompression as well.  This was done using undercutting technique up to the level of the pedicle, with an extraforaminal area also being decompressed with the undercutting technique. At the conclusion of the decompression, all areas were free of neurologic compression. After assessing the complete decompression, we evaluated the facets. An excess of 50% of the facets bilaterally were removed, thus increasing the likelihood of subsequent instability. We then elected to proceed with the posterolateral fusion at the L2-3, L3-4, and L4-5 levels. Posterior elements were decorticated. Local bone was applied. Final x-ray was taken to confirm appropriate levels were addressed. The wound was thoroughly irrigated. Hemostasis was maintained. FloSeal was applied to help with hemostasis and then was irrigated away. Valsalva maneuver confirmed that there was no spinal leak. Margoth Lias was applied, subfascial drain was applied. The fascia was closed in water-tight fashion with figure-of-eight 1-0 Vicryl; 2-0 Vicryl was used for the subcutaneous tissues and running 3-0 subcuticular stitches were applied. Steri-Strips were applied. Sterile dressings were applied. The patient was extubated and taken to the recovery room in stable condition and was neurologically intact at its baseline and stable on arrival.  Needle and Ray-Queenie counts were correct at the conclusion of the case.     Dictated By Asya Broussard M.D.  d: 06/18/2022 14:47:02  t: 06/18/2022 16:27:25  Job [id number]  X/

## 2022-06-18 NOTE — PLAN OF CARE
POD 3 L2-L5 fusion, Pt is AAOX4, VSS, room air, NANCY's & SCD's, IV SL, PO meds for pain, see MAR, dressing changed to coverlet, no drainage noted, steri-strips remain in place, Hx on neuropathy to BLE, voiding to purewick, Rt LE weakness, plan to discharge to [facility] when medically ready, Pt doing well, all needs met, all safety measures in place, call light within reach.

## 2022-06-18 NOTE — PLAN OF CARE
Patient A & O x4. VSS, on RA. C/o severe pain, PO medication given. Dressing to back is clean, dry and intact. Gel ice wrap applied. Patient was able to stand at edge of bed with x2 assist with walker and gait belt. Safety measures in place. Instructed to use call light.

## 2022-06-18 NOTE — CM/SW NOTE
DENZEL spoke with Amanuel Rodriguez at [facility] regarding pt's discharge, and she reviewed chart, and they are unable to accept pt today due to low sodium, and elevated WBC's. Pt will require a rapid COVID test 72 hours prior to admitting to [facility]. RN updated. DENZEL to follow.

## 2022-06-18 NOTE — PLAN OF CARE
Paged hospitalist D/T : [facility] won't accept Pt today D/T Sodium=129 and WBC 12.4,    No new orders at this time.

## 2022-06-19 VITALS
HEIGHT: 63 IN | BODY MASS INDEX: 27.34 KG/M2 | DIASTOLIC BLOOD PRESSURE: 60 MMHG | RESPIRATION RATE: 18 BRPM | WEIGHT: 154.31 LBS | HEART RATE: 72 BPM | OXYGEN SATURATION: 98 % | SYSTOLIC BLOOD PRESSURE: 130 MMHG | TEMPERATURE: 99 F

## 2022-06-19 LAB
ANION GAP SERPL CALC-SCNC: 4 MMOL/L (ref 0–18)
BUN BLD-MCNC: 21 MG/DL (ref 7–18)
CALCIUM BLD-MCNC: 8.5 MG/DL (ref 8.5–10.1)
CHLORIDE SERPL-SCNC: 100 MMOL/L (ref 98–112)
CO2 SERPL-SCNC: 31 MMOL/L (ref 21–32)
CREAT BLD-MCNC: 0.68 MG/DL
ERYTHROCYTE [DISTWIDTH] IN BLOOD BY AUTOMATED COUNT: 13.4 %
GLUCOSE BLD-MCNC: 87 MG/DL (ref 70–99)
HCT VFR BLD AUTO: 30.9 %
HGB BLD-MCNC: 10.1 G/DL
LACTATE SERPL-SCNC: 0.6 MMOL/L (ref 0.4–2)
MAGNESIUM SERPL-MCNC: 2 MG/DL (ref 1.6–2.6)
MCH RBC QN AUTO: 32.1 PG (ref 26–34)
MCHC RBC AUTO-ENTMCNC: 32.7 G/DL (ref 31–37)
MCV RBC AUTO: 98.1 FL
OSMOLALITY SERPL CALC.SUM OF ELEC: 282 MOSM/KG (ref 275–295)
PLATELET # BLD AUTO: 228 10(3)UL (ref 150–450)
POTASSIUM SERPL-SCNC: 3.7 MMOL/L (ref 3.5–5.1)
POTASSIUM SERPL-SCNC: 3.7 MMOL/L (ref 3.5–5.1)
RBC # BLD AUTO: 3.15 X10(6)UL
SODIUM SERPL-SCNC: 135 MMOL/L (ref 136–145)
WBC # BLD AUTO: 9.9 X10(3) UL (ref 4–11)

## 2022-06-19 PROCEDURE — 83735 ASSAY OF MAGNESIUM: CPT | Performed by: INTERNAL MEDICINE

## 2022-06-19 PROCEDURE — 87040 BLOOD CULTURE FOR BACTERIA: CPT | Performed by: INTERNAL MEDICINE

## 2022-06-19 PROCEDURE — 84132 ASSAY OF SERUM POTASSIUM: CPT | Performed by: INTERNAL MEDICINE

## 2022-06-19 PROCEDURE — 80048 BASIC METABOLIC PNL TOTAL CA: CPT | Performed by: INTERNAL MEDICINE

## 2022-06-19 PROCEDURE — 85027 COMPLETE CBC AUTOMATED: CPT | Performed by: INTERNAL MEDICINE

## 2022-06-19 PROCEDURE — 83605 ASSAY OF LACTIC ACID: CPT | Performed by: INTERNAL MEDICINE

## 2022-06-19 RX ORDER — GABAPENTIN 100 MG/1
300 CAPSULE ORAL 3 TIMES DAILY
Qty: 90 CAPSULE | Refills: 0 | Status: SHIPPED | OUTPATIENT
Start: 2022-06-19

## 2022-06-19 RX ORDER — METHOCARBAMOL 500 MG/1
500 TABLET, FILM COATED ORAL 4 TIMES DAILY PRN
Qty: 60 TABLET | Refills: 1 | Status: SHIPPED | OUTPATIENT
Start: 2022-06-19

## 2022-06-19 RX ORDER — OXYCODONE HYDROCHLORIDE 20 MG/1
20 TABLET ORAL EVERY 4 HOURS PRN
Qty: 60 TABLET | Refills: 0 | Status: SHIPPED | OUTPATIENT
Start: 2022-06-19

## 2022-06-19 RX ORDER — POTASSIUM CHLORIDE 20 MEQ/1
40 TABLET, EXTENDED RELEASE ORAL ONCE
Status: COMPLETED | OUTPATIENT
Start: 2022-06-19 | End: 2022-06-19

## 2022-06-19 RX ORDER — METHOCARBAMOL 500 MG/1
500 TABLET, FILM COATED ORAL EVERY 6 HOURS PRN
Status: DISCONTINUED | OUTPATIENT
Start: 2022-06-19 | End: 2022-06-19

## 2022-06-19 RX ORDER — NALOXONE HYDROCHLORIDE 4 MG/.1ML
4 SPRAY, METERED NASAL AS NEEDED
Qty: 1 KIT | Refills: 0 | Status: SHIPPED | OUTPATIENT
Start: 2022-06-19

## 2022-06-19 NOTE — CM/SW NOTE
Per RN, patient ok to go to [facility] for [facility]te ambulance to  at 2pm.  PCS form completed.     RN to call report, updated with room number     1112   unit #[phone]

## 2022-06-19 NOTE — PROGRESS NOTES
Discharge AVS reviewed with patient. Discharged education video played for patient. All questions answered. IV removed. Oxy script placed in discharge packet and given to patient. Patient decided to have her daughter pick her up instead of the medicar. Daughter will be here around 2:00-2:15PM.    1330: Called [address] [place] [address] at #[phone]. Spoke with Charlotte, provided my name, callback number, and patient's estimated discharge time. Nurse to call me back for report. 1350: received call back from Lexa Alfaro at [address] [place] [address] gave her report.

## 2022-06-19 NOTE — PLAN OF CARE
Patient A & O x4. VSS, on RA. C/o mod-severe pain, PO meds given. Surgical incision to back is covered with coverlet is clean, dry and intact. Voiding via purewick. Safety measures in place. Instructed to use call light.

## 2022-06-19 NOTE — PLAN OF CARE
Pain controlled on oxycodone and Robaxin PRN. Coverlet clean and dry to back, will change today. Vitals stable on room air. Tolerating regular diet. Voiding without difficulty. Up with min assist and walker. SCD's and katlin hose on bilaterally. Fall precautions in place. Plan for discharge to [facility] at 1400. Plan of care discussed with patient who agrees.

## 2022-06-21 NOTE — DISCHARGE SUMMARY
[facility]  Discharge Summary    Romeo Slater Patient Status:  Inpatient    1945 MRN [id number]   [facility] 3SW-A Attending No att. providers found   Hosp Day # 4 PCP Erasmo Del Valle MD     Date of Admission: 6/15/2022    Date of Discharge: 2022    Admitting Diagnosis: LUMBAR 3 - LUMBAR 4 STENOSIS, LUMBAR 2 - LUMBAR 5 STENOSIS  Spinal stenosis    Discharge Diagnosis: Patient Active Problem List:     Right shoulder pain     Complete rupture of rotator cuff     RA (rheumatoid arthritis) (HCC)     Chronic narcotic use     Fibromyalgia     Subscapularis avulsion     Hypothyroid     Migraines     Other and unspecified hyperlipidemia     Major depression     Bilateral knee pain     H/O total knee replacement, left     H/O total knee replacement, right     Status post total right knee replacement     Right patella fracture, closed, initial encounter     Spondylosis of lumbar region without myelopathy or radiculopathy     DDD (degenerative disc disease), lumbar     Lumbar radiculitis     Spinal stenosis of lumbar region     Osteopenia     Palpitations     Rheumatoid arthritis of multiple sites with negative rheumatoid factor (HCC)     HLA-B27 spondyloarthropathy     Uncomplicated opioid dependence ([address]     History of pulmonary embolism     Osteoporosis     Frequent falls     C. difficile diarrhea     Essential hypertension     Chronic diastolic congestive heart failure ([address]     History of Clostridium difficile colitis     Lumbar facet arthropathy     Hemiparesis due to recent cerebrovascular accident (CVA) ([address]     Mild persistent asthma without complication     Immunocompromised ([address]     Stage 3 chronic kidney disease, unspecified whether stage 3a or 3b CKD ([address]     Gallstones     Spinal stenosis      Hospital course: The patient was admitted on above date to undergo elective surgical intervention understanding risks and benefits to surgery after failing conservative care.   Patient underwent   Surgical Procedures     Case IDs Date Procedure Surgeon Location Status    [id number] 6/15/22 LUMBAR 2 LUMBAR 3, LUMBAR 3 - LUMBAR 4, LUMBAR 4 - LUMBAR 5 DECOMPRESSION AND UNINSTRUMENTED FUSION Alonso Hodge MD [address] [facility] OR [facility]        Afterward, pt was brought to the PACU in stable condition. After the recovery room the patient was transferred to the floor using standard protocol orders. Once on the floor the patient was followed by spine service and medical services as well as appropriate ancillary consultations throughout the hospital stay. The patient participated in Physical and Occupational Therapy making steady progressive gains. Once deemed stable by all services the patient was discharged on the above date. Standard discharge instructions were given and they were asked to follow up in clinic in 2 weeks. Disposition: Inpt Physical Rehab Facility or Physical Rehab Unit      Discharge Medications: Discharge Medication List as of 6/19/2022 12:36 PM    START taking these medications    oxyCODONE 20 MG Oral Tab  Take 1 tablet (20 mg total) by mouth every 4 (four) hours as needed. , Print Script, Disp-60 tablet, R-0    Naloxone HCl 4 MG/0.1ML Nasal Liquid  4 mg by Nasal route as needed. If patient remains unresponsive, repeat dose in other nostril 2-5 minutes after first dose., Normal, Disp-1 kit, R-0      CONTINUE these medications which have CHANGED    aspirin 81 MG Oral Tab  Take 1 tablet (81 mg total) by mouth daily. Ok to resume on 6/20/2022, Historical, R-0    gabapentin 100 MG Oral Cap  Take 3 capsules (300 mg total) by mouth 3 (three) times daily. , Normal, Disp-90 capsule, R-0    methocarbamol 500 MG Oral Tab  Take 1 tablet (500 mg total) by mouth 4 (four) times daily as needed. , Print Script, Disp-60 tablet, R-1      CONTINUE these medications which have NOT CHANGED    furosemide 20 MG Oral Tab  Take 20 mg by mouth daily. , Historical    Teriparatide, Recombinant, (FORTEO SC)  Inject into the skin. Once per day - Patient has not started yet, Historical    ondansetron (ZOFRAN) 4 mg tablet  Take 1 tablet (4 mg total) by mouth every 8 (eight) hours as needed for Nausea., Normal, Disp-21 tablet, R-0    methotrexate 2.5 MG Oral Tab  Take 8 tablets (20 mg total) by mouth every 7 days. , Normal, Disp-96 tablet, R-0    levothyroxine 112 MCG Oral Tab  Take 1 tablet (112 mcg total) by mouth daily. , Normal, Disp-90 tablet, R-1    DULoxetine 60 MG Oral Cap DR Particles  Take 1 capsule (60 mg total) by mouth every morning., Normal, Disp-90 capsule, R-1    omeprazole 20 MG Oral Capsule Delayed Release  Take 1 capsule (20 mg total) by mouth every morning., Normal, Disp-180 capsule, R-2BID WITH MEALS    predniSONE 5 MG Oral Tab  Take 1 tablet (5 mg total) by mouth once daily. , Normal, Disp-90 tablet, R-1    metoprolol succinate 50 MG Oral Tablet 24 Hr  Take 1 tablet (50 mg total) by mouth daily. , Normal, Disp-90 tablet, R-0    amoxicillin 500 MG Oral Cap  Prior to dental work only, Historical    Budesonide-Formoterol Fumarate (SYMBICORT) 160-4.5 MCG/ACT Inhalation Aerosol  Inhale 2 puffs into the lungs 2 (two) times daily. , Normal, Disp-3 each, R-1    Etanercept (ENBREL SURECLICK) 50 MG/ML Subcutaneous Solution Auto-injector  Inject 50 mg into the skin once a week., Normal, Disp-12 Syringe, R-0    SUMAtriptan Succinate 100 MG Oral Tab  TAKE 1/2 TO 1 TABLET BY MOUTH AS NEEDED FOR MIGRAINE AS DIRECTED, Normal, Disp-9 tablet, R-2    folic acid 296 MCG Oral Tab  Take 1 tablet (800 mcg total) by mouth daily. , Historical, Disp-30 tablet, R-0    EPINEPHrine 0.3 MG/0.3ML Injection Solution Auto-injector  Inject as directed. PRN for bee sting, Historical    Acidophilus/Pectin (PROBIOTIC) Oral Cap  Take 1 capsule by mouth daily. , Historical      STOP taking these medications    HYDROcodone-acetaminophen 5-325 MG Oral Tab    ibuprofen 200 MG Oral Tab    Buprenorphine HCl-Naloxone HCl 8-2 MG Sublingual SL Tab          Delmi Stake Víctor Robles [place]  6/21/2022  7:55 AM

## 2023-04-18 ENCOUNTER — TELEPHONE (OUTPATIENT)
Dept: ORTHOPEDICS CLINIC | Facility: CLINIC | Age: 78
End: 2023-04-18

## 2023-04-18 NOTE — TELEPHONE ENCOUNTER
LMOM letting patient know Dr. Colin Hutchison has ordered right hip imaging. Patient can get imaging done either today or arrive early to tomorrow's appointment. Asked that she call us back to let us know what she prefers.

## 2023-04-19 ENCOUNTER — HOSPITAL ENCOUNTER (OUTPATIENT)
Dept: GENERAL RADIOLOGY | Age: 78
Discharge: HOME | End: 2023-04-19
Attending: ORTHOPAEDIC SURGERY
Payer: MEDICARE

## 2023-04-19 ENCOUNTER — LAB ENCOUNTER (OUTPATIENT)
Dept: LAB | Age: 78
End: 2023-04-19
Attending: ORTHOPAEDIC SURGERY
Payer: MEDICARE

## 2023-04-19 ENCOUNTER — OFFICE VISIT (OUTPATIENT)
Dept: ORTHOPEDICS CLINIC | Facility: CLINIC | Age: 78
End: 2023-04-19
Payer: MEDICARE

## 2023-04-19 ENCOUNTER — TELEPHONE (OUTPATIENT)
Dept: ORTHOPEDICS CLINIC | Facility: CLINIC | Age: 78
End: 2023-04-19

## 2023-04-19 VITALS — WEIGHT: 154 LBS | HEIGHT: 63 IN | BODY MASS INDEX: 27.29 KG/M2

## 2023-04-19 DIAGNOSIS — M25.551 HIP PAIN, RIGHT: ICD-10-CM

## 2023-04-19 DIAGNOSIS — D64.9 ANEMIA, UNSPECIFIED: Primary | ICD-10-CM

## 2023-04-19 DIAGNOSIS — M51.36 DDD (DEGENERATIVE DISC DISEASE), LUMBAR: ICD-10-CM

## 2023-04-19 DIAGNOSIS — Z96.641 H/O TOTAL HIP ARTHROPLASTY, RIGHT: ICD-10-CM

## 2023-04-19 DIAGNOSIS — Z96.641 H/O TOTAL HIP ARTHROPLASTY, RIGHT: Primary | ICD-10-CM

## 2023-04-19 DIAGNOSIS — M70.61 TROCHANTERIC BURSITIS OF RIGHT HIP: ICD-10-CM

## 2023-04-19 LAB
BASOPHILS # BLD AUTO: 0.08 X10(3) UL (ref 0–0.2)
BASOPHILS NFR BLD AUTO: 0.9 %
DEPRECATED HBV CORE AB SER IA-ACNC: 170.7 NG/ML
DEPRECATED RDW RBC AUTO: 56.9 FL (ref 35.1–46.3)
EOSINOPHIL # BLD AUTO: 0.3 X10(3) UL (ref 0–0.7)
EOSINOPHIL NFR BLD AUTO: 3.4 %
ERYTHROCYTE [DISTWIDTH] IN BLOOD BY AUTOMATED COUNT: 16.2 % (ref 11–15)
HCT VFR BLD AUTO: 37.1 %
HGB BLD-MCNC: 11.6 G/DL
IMM GRANULOCYTES # BLD AUTO: 0.04 X10(3) UL (ref 0–1)
IMM GRANULOCYTES NFR BLD: 0.5 %
IRON SATN MFR SERPL: 8 %
IRON SERPL-MCNC: 27 UG/DL
LYMPHOCYTES # BLD AUTO: 1.89 X10(3) UL (ref 1–4)
LYMPHOCYTES NFR BLD AUTO: 21.7 %
MCH RBC QN AUTO: 30.2 PG (ref 26–34)
MCHC RBC AUTO-ENTMCNC: 31.3 G/DL (ref 31–37)
MCV RBC AUTO: 96.6 FL
MONOCYTES # BLD AUTO: 0.24 X10(3) UL (ref 0.1–1)
MONOCYTES NFR BLD AUTO: 2.8 %
NEUTROPHILS # BLD AUTO: 6.17 X10 (3) UL (ref 1.5–7.7)
NEUTROPHILS # BLD AUTO: 6.17 X10(3) UL (ref 1.5–7.7)
NEUTROPHILS NFR BLD AUTO: 70.7 %
PLATELET # BLD AUTO: 235 10(3)UL (ref 150–450)
RBC # BLD AUTO: 3.84 X10(6)UL
TIBC SERPL-MCNC: 352 UG/DL (ref 240–450)
TRANSFERRIN SERPL-MCNC: 236 MG/DL (ref 200–360)
WBC # BLD AUTO: 8.7 X10(3) UL (ref 4–11)

## 2023-04-19 PROCEDURE — 36415 COLL VENOUS BLD VENIPUNCTURE: CPT

## 2023-04-19 PROCEDURE — 73502 X-RAY EXAM HIP UNI 2-3 VIEWS: CPT | Performed by: ORTHOPAEDIC SURGERY

## 2023-04-19 PROCEDURE — 82728 ASSAY OF FERRITIN: CPT

## 2023-04-19 PROCEDURE — 85025 COMPLETE CBC W/AUTO DIFF WBC: CPT

## 2023-04-19 PROCEDURE — 84466 ASSAY OF TRANSFERRIN: CPT

## 2023-04-19 PROCEDURE — 83540 ASSAY OF IRON: CPT

## 2023-04-19 RX ORDER — TRIAMCINOLONE ACETONIDE 40 MG/ML
40 INJECTION, SUSPENSION INTRA-ARTICULAR; INTRAMUSCULAR ONCE
Status: COMPLETED | OUTPATIENT
Start: 2023-04-19 | End: 2023-04-19

## 2023-04-19 RX ORDER — HYDROCODONE BITARTRATE AND ACETAMINOPHEN 5; 325 MG/1; MG/1
TABLET ORAL
COMMUNITY
Start: 2023-04-12

## 2023-04-19 RX ADMIN — TRIAMCINOLONE ACETONIDE 40 MG: 40 INJECTION, SUSPENSION INTRA-ARTICULAR; INTRAMUSCULAR at 14:44:00

## 2023-04-19 NOTE — TELEPHONE ENCOUNTER
Future Appointments   Date Time Provider Vignesh Baumani   4/19/2023  2:00 PM [facility] XR IC RM1 [facility] RAD [facility] [place]   4/19/2023  2:40 PM Kayla Irizarry MD [facility] OR[facility]O [facility] [place]       Patient already scheduled her own xray.

## 2023-04-19 NOTE — TELEPHONE ENCOUNTER
Future Appointments   Date Time Provider Vignesh Winston   4/19/2023  2:40 PM Mariia Shipley MD [facility] [place]       Patient was trying to schedule her own xray for today's appt but the status says pending so she wants to know why she cannot schedule. Please advise. Thanks.     Patient can be reached at [phone]

## 2023-04-19 NOTE — TELEPHONE ENCOUNTER
X-Ray ordered. Please schedule with patient. Thank you.     Future Appointments   Date Time Provider Vignesh Tish   4/19/2023  2:40 PM Mena Walters MD [facility] OR[facility]O [facility]

## 2023-04-24 NOTE — PROGRESS NOTES
Patient is a 61-year-old white female complaining of right hip pain leg pain. Patient has had a right total hip arthroplasty performed in the past.  That was converted to a constrained liner after having recurrent dislocations. Patient presents today with complaints of some right hip pain mostly laterally located. Patient also has some pain in the right buttock region. Patient is also had problems with her lumbar spine in the past.  Patient denies significant weakness of the lower extremities that has changed recently. Patient's primary complaint is that of pain. Patient's daughter is with her today. Patient's exam shows that have no significant pain with passive range of motion of the right hip. No guarding. Patient does ambulate with a walker but shows a nonantalgic gait. Patient does have mild to moderate tenderness over the lateral aspect of the right hip. Active flexion extension of both the hip knee and ankle are present. Patient's x-rays of the right hip shows well-maintained hip arthroplasty with constrained ring around the acetabulum in normal position. No evidence of loosening of the femoral component. Impression is that of trochanteric bursitis of the right hip. Second impression is that of possible radiculopathy of the right leg related to the lumbar spine. Third impression is that of stable right total hip arthroplasty. Recommendations are to go ahead with a cortisone injection which was done in the trochanteric bursal region of the right hip. Patient tolerated it well. Patient was advised to follow-up with me in about a month's time if she is not improving. If problem persist we might consider a bone scan. I would also probably will have her evaluated for the lumbar spine prior to going in that direction since this does not appear to be a prosthetic loosening issue. wheelchair

## 2023-09-06 ENCOUNTER — HOSPITAL ENCOUNTER (OUTPATIENT)
Dept: GENERAL RADIOLOGY | Age: 78
Discharge: HOME | End: 2023-09-06
Attending: ORTHOPAEDIC SURGERY
Payer: MEDICARE

## 2023-09-06 ENCOUNTER — OFFICE VISIT (OUTPATIENT)
Dept: ORTHOPEDICS CLINIC | Facility: CLINIC | Age: 78
End: 2023-09-06
Payer: MEDICARE

## 2023-09-06 VITALS — BODY MASS INDEX: 27.29 KG/M2 | HEIGHT: 63 IN | WEIGHT: 154 LBS

## 2023-09-06 DIAGNOSIS — M19.012 PRIMARY OSTEOARTHRITIS OF LEFT SHOULDER: ICD-10-CM

## 2023-09-06 DIAGNOSIS — M25.512 LEFT SHOULDER PAIN, UNSPECIFIED CHRONICITY: ICD-10-CM

## 2023-09-06 DIAGNOSIS — M75.42 IMPINGEMENT SYNDROME OF LEFT SHOULDER: Primary | ICD-10-CM

## 2023-09-06 DIAGNOSIS — M75.102 NON-TRAUMATIC ROTATOR CUFF TEAR, LEFT: ICD-10-CM

## 2023-09-06 PROCEDURE — 73030 X-RAY EXAM OF SHOULDER: CPT | Performed by: ORTHOPAEDIC SURGERY

## 2023-09-06 PROCEDURE — 20610 DRAIN/INJ JOINT/BURSA W/O US: CPT | Performed by: ORTHOPAEDIC SURGERY

## 2023-09-06 PROCEDURE — 99213 OFFICE O/P EST LOW 20 MIN: CPT | Performed by: ORTHOPAEDIC SURGERY

## 2023-09-06 RX ORDER — TRIAMCINOLONE ACETONIDE 40 MG/ML
40 INJECTION, SUSPENSION INTRA-ARTICULAR; INTRAMUSCULAR ONCE
Status: COMPLETED | OUTPATIENT
Start: 2023-09-06 | End: 2023-09-06

## 2023-09-06 RX ADMIN — TRIAMCINOLONE ACETONIDE 40 MG: 40 INJECTION, SUSPENSION INTRA-ARTICULAR; INTRAMUSCULAR at 14:02:00

## 2023-09-06 NOTE — PROGRESS NOTES
Patient is a 17-year-old white female here for follow-up on her left shoulder. Patient has had persistent pain of the shoulder that is somewhat progressive. Patient denies any recent trauma. Patient's brother is with her today. Patient's exam shows her to have crepitation with passive range of motion left shoulder. Patient has some weakness of abduction left shoulder. Forward elevation is to about 140 degrees. Abduction about 110 to 120 degrees. X-rays of the left shoulder shows complete joint space narrowing as well as subacromial space narrowing. Minimal to mild osteophyte formation present. Some mild to moderate osteopenia present. Impression is that of degenerative arthritis and chronic rotator cuff tear of the left shoulder. Second impression is that of impingement of left shoulder. Recommendations are to go ahead with a cortisone injection which was done under sterile technique through a lateral approach with 4 cc of Xylocaine and Marcaine and 40 mg of Kenalog. Patient tolerated the injection well. Patient was advised to follow-up with us as needed. Patient is aware that I am retiring and that she should follow-up with Dr. Charlee Humphrey or Dr. Khang Moraes if needed. Patient does realize that if injections are not effective and if her pain increases a reverse total shoulder arthroplasty would be a consideration.

## 2023-10-25 ENCOUNTER — TELEPHONE (OUTPATIENT)
Dept: ORTHOPEDICS CLINIC | Facility: CLINIC | Age: 78
End: 2023-10-25

## 2023-10-25 DIAGNOSIS — M25.551 RIGHT HIP PAIN: Primary | ICD-10-CM

## 2023-10-25 NOTE — TELEPHONE ENCOUNTER
Imaging was completed for this patient for a Pain right hip, but it needs to be reviewed to see if additional imaging is needed. If so, please enter the appropriate RX and let the patient know to come in before their appointment to complete the additional imaging. Thank you!     Future Appointments   Date Time Provider Vignesh Winston   12/4/2023  2:00 PM Fatoumata Jackson MD [facility]

## (undated) DEVICE — WRAP COOLING BACK W/NO PILLOW

## (undated) DEVICE — UNDYED BRAIDED (POLYGLACTIN 910), SYNTHETIC ABSORBABLE SUTURE: Brand: COATED VICRYL

## (undated) DEVICE — 3M™ TEGADERM™ TRANSPARENT FILM DRESSING, 1626W, 4 IN X 4-3/4 IN (10 CM X 12 CM), 50 EACH/CARTON, 4 CARTON/CASE: Brand: 3M™ TEGADERM™

## (undated) DEVICE — MEGADYNE E-Z CLEAN BLADE 4IN

## (undated) DEVICE — STERILE POLYISOPRENE POWDER-FREE SURGICAL GLOVES: Brand: PROTEXIS

## (undated) DEVICE — ELECTRODE EDGE PENCIL 10FT

## (undated) DEVICE — FLOSEAL HEMOSTATIC MATRIX, 5ML: Brand: FLOSEAL HEMOSTATIC MATRIX

## (undated) DEVICE — Device

## (undated) DEVICE — KIT DRN 1/8IN PVC 3 SPRG EVAC

## (undated) DEVICE — SLEEVE KENDALL SCD EXPRESS MED

## (undated) DEVICE — SOLUTION  .9 1000ML BTL

## (undated) DEVICE — LAMINECTOMY CDS: Brand: MEDLINE INDUSTRIES, INC.

## (undated) DEVICE — SUT VICRYL 1 OS-6 J535H

## (undated) DEVICE — Device: Brand: INTELLICART™

## (undated) DEVICE — PEN SKIN MARKING REG TIP VIOLT

## (undated) DEVICE — 3.0MM PRECISION ROUND

## (undated) DEVICE — LIGHT HANDLE

## (undated) DEVICE — 5.0MM PRECISION ROUND

## (undated) DEVICE — 450 ML BOTTLE OF 0.05% CHLORHEXIDINE GLUCONATE IN 99.95% STERILE WATER FOR IRRIGATION, USP AND APPLICATOR.: Brand: IRRISEPT ANTIMICROBIAL WOUND LAVAGE

## (undated) DEVICE — SUT VICRYL 2-0 FSL J589H

## (undated) DEVICE — 3M(TM) TEGADERM(TM) TRANSPARENT FILM DRESSING FRAME STYLE 1628: Brand: 3M™ TEGADERM™

## (undated) NOTE — LETTER
Patient Name: Lucian Pineda CSN: [id number]  -Age / Sex: 1945-A: 78 y  adult Medical Records: [id number]    ABNORMAL VALUES  Surgeon(s):  Félix Jose MD  Anesthesia Type: General  Procedure Description: LUMBAR 3 - LUMBAR 4 DISCECTOMY WITH UNINSTRUMENTED FUSION, LUMBAR 2 LUMBAR 3, LUMBAR 3 - LUMBAR 4, LUMBAR 4 - LUMBAR 5 DECOMPRESSION  Primary Surgeon:  Félix Jose MD  Phone Number: [phone]    PLEASE NOTE THE FOLLOWING ABNORMALITIES:   UA - Please disregard previous letter regarding abnormal UA  ________________________________________________________

## (undated) NOTE — LETTER
ZARIA Notifier: [address]  B. Patient Name: Yobany Ferreira Identification Number: [id number]    Advance Beneficiary Notice of Non-coverage (ABN)  NOTE: If Medicare doesn't pay for D. item/service(s) below, you may have to pay. Medicare does not pay for everything, even some care that you or your health care provider have good reason to think you need. We expect Medicare may not pay for the D. item/service(s) below. DTressa Fahad Reason Medicare May Not Pay: F. Estimated Cost   ___Kenalog with Lidocaine and Marcaine   $320  ___Betamethasone with Lidocaine and/or Marcaine    $48  ___ Kenalog, Ketorolac, with Lidocaine and/or Marcaine   $370  ___Durolane      $6879  ___Euflexxa          $804  ___Hyalgan/Supartz  $6373  ___Orthovisc         $196  ___Synvisc   $1104  ___Synvisc One    $368  ___Monovisc         $1088  ___Zilretta        $856  ___Injection intra-articular $260  __  Medicare does not cover this service      __  Medicare may not pay for this   item/service for your condition     __  Medicare may not pay for this item/service as often as this          WHAT YOU NEED TO DO NOW:  Read this notice, so you can make an informed decision about your care. Ask us any questions that you may have after you finish reading. Choose an option below about whether to receive the D. item/service(s) listed above. Note: If you choose Option 1 or 2, we may help you to use any other insurance that you might have, but Medicare cannot require us to do this. G. OPTIONS: Check only one box. We cannot choose a box for you. OPTION 1. I want the D. item/service(s) listed above. You may ask to be paid now, but I also want Medicare billed for an official decision on payment, which is sent to me on a Medicare Summary Notice (MSN). I understand that if Medicare doesn't pay, I am responsible for payment, but I can appeal to Medicare by following the directions on the MSN.  If Medicare does pay, you will refund any payments I made to you, less co-pays or deductibles. OPTION 2. I want the D. item/service(s) listed above, but do not bill Medicare. You may ask to be paid now as I am responsible for payment. I cannot appeal if Medicare is not billed. OPTION 3. I don't want the D. item/service(s) listed above. I understand with this choice I am not responsible for payment, and I cannot appeal to see if Medicare would pay. H. Additional Information: This notice gives our opinion, not an official Medicare decision. If you have other questions on this notice or Medicare billing, call 1-800-MEDICARE (1-105.682.3055/ZUS: 9-930.809.4720). Signing below means that you have received and understand this notice. You may ask to receive a copy. I. Signature: J. Date:   You have the right to get Medicare information in an accessible format, like large print, Braille, or audio. You also have the right to file a complaint if you feel you've been discriminated against. Visit Medicare.gov/about- us/npsrmpkelgcrw-whsxzjfzckpqlxsaw-szxncq. According to the Paperwork Reduction Act of 1995, no persons are required to respond to a collection of information unless it displays a valid OMB control number. The valid OMB control number for this information collection is 6183-6557. The time required to complete this information collection is estimated to average 7 minutes per response, including the time to review instructions, search existing data resources, gather the data needed, and complete and review the information collection. If you have comments concerning the accuracy of the time estimate or suggestions for improving this form, please write to: CMS, [address], Attn: [address]     Form CMS-R-131 (Exp.01/31/2026) Form Approved OMB No. [id number]

## (undated) NOTE — LETTER
Patient Name: Chalo Perry CSN: [id number]  -Age / Sex: 1945-A: 78 y  adult Medical Records: [id number]    ABNORMAL VALUES  Surgeon(s):  Marino Stoner MD  Anesthesia Type: General  Procedure Description: LUMBAR 3 - LUMBAR 4 DISCECTOMY WITH UNINSTRUMENTED FUSION, LUMBAR 2 LUMBAR 3, LUMBAR 3 - LUMBAR 4, LUMBAR 4 - LUMBAR 5 DECOMPRESSION  Primary Surgeon:  Marino Stoner MD  Phone Number: [phone]    PLEASE NOTE THE FOLLOWING ABNORMALITIES:   UA see results 2022 - Elevated RBC's  ________________________________________________________

## (undated) NOTE — LETTER
MEDICATION CONTRAINDICATION     Patient Name: Jacquie Coreas CSN: [id number]  -Age / Sex: 1945-A: 78 y  adult Medical Records: [id number]    Surgeon(s):  Ramiro Wilburn MD  Procedure: LUMBAR 3 - LUMBAR 4 DISCECTOMY WITH UNINSTRUMENTED FUSION, LUMBAR 2 LUMBAR 3, LUMBAR 3 - LUMBAR 4, LUMBAR 4 - LUMBAR 5 DECOMPRESSION, N/A - Spine Lumbar    Procedure Comments: LUMBAR 3 - LUMBAR 4 DISCECTOMY WITH UNINSTRUMENTED FUSION, LUMBAR 2 LUMBAR 3, LUMBAR 3 - LUMBAR 4, LUMBAR 4 - LUMBAR 5 DECOMPRESSION  Anesthesia Type: General    Celebrex contraindicated due to sulfamethoxazole with trimethoprim allergy     The above patient has a contraindication to the medication ordered from your standing orders/[facility] Pre-Op STanding orders listed below. If you would like the medication given, please fax this form back indicating the override reason with physician signature/date/time.               ___  Benefit outweighs risk   ___  Insignificant               ___ Low risk                 ___ Not a true allergy              ___ Dose appropriate                ___  Tolerated regimen in the past     Physician Signature: ________________________ Date/Time: ______________  Kelly Tejada FORM BACK TO:  [phone]  Rev 14

## (undated) NOTE — IP AVS SNAPSHOT
[address]            (For Outpatient Use Only) Initial Admit Date: 6/15/2022   Inpt/Obs Admit Date: Inpt: 6/15/22 / Obs: N/A   Discharge Date:    Farhana Rahman:  [de-identified]   MRN: [de-identified]   CSN: [id number]   CEID: [id number]        ENCOUNTER  Patient Class: Inpatient Admitting Provider: Juan Camarillo MD Unit: [facility] 3SW-A   Hospital Service: Ortho/Spine Attending Provider: Juan Camarillo MD   Bed: 352-A   Visit Type:   Referring Physician: No ref. provider found Billing Flag:    Admit Diagnosis: LUMBAR 3 - LUMBAR 4 STENOSIS, LUMBAR 2 - LUMBAR 5 STENOSIS      PATIENT  Legal Name:   Shilpa Reed   Legal Sex: Female  Gender ID: Female             Pref Name:   [address] Whitlock PCP:  Jose Luis Churchill MD Home: [phone]   Address:  [place] [address] : 1945 (77 yrs) Mobile: [phone]         City/State/Zip: [facility] Marital:  Language: [place] park: [place] SSN4: [id number] Yazidi: [address] No*     Race: White Ethnicity: Non  Or [address] [place] [address] Street   Name Relationship Legal Guardian? Home Phone Work Phone Mobile Phone   1. Jennifer Alcantara  2.  QuintonDilip Daughter  Relative          [phone]     GUARANTOR  Guarantor: Misael Díaz : 1945 Home Phone: [phone]   Address: [place] [address]  Sex: Female Work Phone:    City/State/Zip: [facility]   Rel. to Patient: Self Guarantor ID: [id number]   GUARANTOR EMPLOYER   Employer:  Status: RETIRED     COVERAGE  PRIMARY INSURANCE   Payor: MEDICARE Plan: MEDICARE PART A&B   Group Number:  Insurance Type: INDEMNITY   Subscriber Name: Amalia Santana : 1945   Subscriber ID: [id number] Pt Rel to Subscriber: Self   SECONDARY INSURANCE   Payor: [address] [place] [address] Drive: [address] [place] [address]   Group Number: [id number] Insurance Type: [id number] Name: Amalia Santana : 1945   Subscriber ID: [id number] Pt Rel to Subscriber: SELF   TERTIARY INSURANCE   Payor:  Plan:    Group Number:  Insurance Type:    Subscriber Name:  Subscriber :    Subscriber ID:  Pt Rel to Subscriber:    Hospital Account Financial Class: Medicare    2022